# Patient Record
Sex: FEMALE | Race: WHITE | NOT HISPANIC OR LATINO | Employment: STUDENT | URBAN - METROPOLITAN AREA
[De-identification: names, ages, dates, MRNs, and addresses within clinical notes are randomized per-mention and may not be internally consistent; named-entity substitution may affect disease eponyms.]

---
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
## 2024-06-17 ENCOUNTER — OFFICE VISIT (OUTPATIENT)
Dept: URGENT CARE | Facility: CLINIC | Age: 19
End: 2024-06-17
Payer: COMMERCIAL

## 2024-06-17 VITALS
HEART RATE: 94 BPM | SYSTOLIC BLOOD PRESSURE: 166 MMHG | WEIGHT: 145 LBS | TEMPERATURE: 99 F | HEIGHT: 67 IN | BODY MASS INDEX: 22.76 KG/M2 | DIASTOLIC BLOOD PRESSURE: 96 MMHG

## 2024-06-17 DIAGNOSIS — M79.672 LEFT FOOT PAIN: Primary | ICD-10-CM

## 2024-06-17 DIAGNOSIS — S93.602D SPRAIN OF LEFT FOOT, SUBSEQUENT ENCOUNTER: ICD-10-CM

## 2024-06-17 PROCEDURE — 99204 OFFICE O/P NEW MOD 45 MIN: CPT | Mod: S$GLB,,, | Performed by: STUDENT IN AN ORGANIZED HEALTH CARE EDUCATION/TRAINING PROGRAM

## 2024-06-17 RX ORDER — ALBUTEROL SULFATE 90 UG/1
2 AEROSOL, METERED RESPIRATORY (INHALATION) EVERY 4 HOURS PRN
COMMUNITY

## 2024-06-17 RX ORDER — METHOCARBAMOL 500 MG/1
500 TABLET, FILM COATED ORAL 4 TIMES DAILY PRN
Qty: 30 TABLET | Refills: 0 | Status: SHIPPED | OUTPATIENT
Start: 2024-06-17 | End: 2024-06-27

## 2024-06-17 RX ORDER — CETIRIZINE HYDROCHLORIDE 10 MG/1
10 TABLET ORAL
COMMUNITY
Start: 2024-06-12

## 2024-06-17 RX ORDER — ADAPALENE AND BENZOYL PEROXIDE GEL, 0.1%/2.5% 1; 25 MG/G; MG/G
GEL TOPICAL
COMMUNITY
Start: 2024-03-21

## 2024-06-17 RX ORDER — IBUPROFEN 600 MG/1
600 TABLET ORAL EVERY 6 HOURS PRN
Qty: 30 TABLET | Refills: 0 | Status: SHIPPED | OUTPATIENT
Start: 2024-06-17

## 2024-06-17 RX ORDER — BUSPIRONE HYDROCHLORIDE 7.5 MG/1
7.5 TABLET ORAL 2 TIMES DAILY
COMMUNITY
Start: 2024-03-05

## 2024-06-17 NOTE — PROGRESS NOTES
"Dictation #1  MRN:[id number]  CSN:[id number] Subjective:      Patient ID: Christen Maravilla is a 18 y.o. female.    Vitals:  height is 5' 7" (1.702 m) and weight is 65.8 kg (145 lb). Her temperature is 98.5 °F (36.9 °C). Her blood pressure is 166/96 (abnormal) and her pulse is 94.     Chief Complaint: Foot Injury    Ambulatory to room with complaint of left foot pain.  Patient states over week ago she [place] and does not remember injuring it, states "had too much fun".  Pain to dorsal surface of left foot    Foot Injury   The incident occurred more than 1 week ago (x's 19 days ago). The injury mechanism was a fall. Associated symptoms include an inability to bear weight, a loss of motion and tingling. She reports no foreign bodies present. She has tried NSAIDs for the symptoms. The treatment provided no relief.       Constitution: Negative.   HENT: Negative.     Neck: neck negative.   Cardiovascular: Negative.    Eyes: Negative.    Respiratory: Negative.     Gastrointestinal: Negative.    Genitourinary: Negative.    Musculoskeletal: Negative.  Positive for pain.   Skin: Negative.    Allergic/Immunologic: Negative.    Psychiatric/Behavioral: Negative.        Objective:     Physical Exam   Constitutional: She is oriented to person, place, and time. She appears well-developed. She is cooperative.   HENT:   Head: Normocephalic and atraumatic.   Ears:   Right Ear: Hearing and external ear normal.   Left Ear: Hearing and external ear normal.   Nose: Nose normal. No mucosal edema or nasal deformity. No epistaxis. Right sinus exhibits no maxillary sinus tenderness and no frontal sinus tenderness. Left sinus exhibits no maxillary sinus tenderness and no frontal sinus tenderness.   Mouth/Throat: Uvula is midline, oropharynx is clear and moist and mucous membranes are normal. No trismus in the jaw. Normal dentition. No uvula swelling.   Eyes: Conjunctivae and lids are normal.   Neck: Trachea normal and " phonation normal. Neck supple.   Cardiovascular: Normal rate, regular rhythm, normal heart sounds and normal pulses.   Pulmonary/Chest: Effort normal and breath sounds normal.   Abdominal: Normal appearance and bowel sounds are normal. Soft.   Musculoskeletal: Normal range of motion.         General: Normal range of motion.      Comments: Mild tenderness to palpation dorsal surface left foot, normal range of motion.  No difficulty ambulating.   Neurological: She is alert and oriented to person, place, and time. She exhibits normal muscle tone.   Skin: Skin is warm, dry and intact.   Psychiatric: Her speech is normal and behavior is normal. Judgment and thought content normal.   Nursing note and vitals reviewed.      Assessment:     1. Left foot pain    2. Sprain of left foot, subsequent encounter        Plan:       Left foot pain  -     Cancel: XR FOOT COMPLETE 3 VIEW RIGHT; Future; Expected date: 06/17/2024  -     XR FOOT COMPLETE 3 VIEW LEFT; Future; Expected date: 06/17/2024    Sprain of left foot, subsequent encounter  -     Physician communication order    Other orders  -     methocarbamoL (ROBAXIN) 500 MG Tab; Take 1 tablet (500 mg total) by mouth 4 (four) times daily as needed.  Dispense: 30 tablet; Refill: 0  -     ibuprofen (ADVIL,MOTRIN) 600 MG tablet; Take 1 tablet (600 mg total) by mouth every 6 (six) hours as needed for Pain.  Dispense: 30 tablet; Refill: 0           Imaging in clinic reveals no acute abnormality.  R.I.C.E. therapy.  Rest, ice, compress, elevate.  For the next 24-48 hours please continue rest, intermittently use ice packs and continue to wear your Ace wrap, maintain elevation to the limb.

## 2024-11-07 ENCOUNTER — OFFICE VISIT (OUTPATIENT)
Dept: URGENT CARE | Facility: CLINIC | Age: 19
End: 2024-11-07
Payer: COMMERCIAL

## 2024-11-07 VITALS
WEIGHT: 145 LBS | DIASTOLIC BLOOD PRESSURE: 82 MMHG | RESPIRATION RATE: 17 BRPM | TEMPERATURE: 99 F | OXYGEN SATURATION: 98 % | BODY MASS INDEX: 22.76 KG/M2 | HEIGHT: 67 IN | HEART RATE: 74 BPM | SYSTOLIC BLOOD PRESSURE: 127 MMHG

## 2024-11-07 DIAGNOSIS — H65.92 MIDDLE EAR EFFUSION, LEFT: ICD-10-CM

## 2024-11-07 DIAGNOSIS — H66.92 LEFT OTITIS MEDIA, UNSPECIFIED OTITIS MEDIA TYPE: ICD-10-CM

## 2024-11-07 DIAGNOSIS — H61.23 BILATERAL IMPACTED CERUMEN: Primary | ICD-10-CM

## 2024-11-07 RX ORDER — NORETHINDRONE ACETATE AND ETHINYL ESTRADIOL AND FERROUS FUMARATE 1MG-20(21)
1 KIT ORAL
COMMUNITY

## 2024-11-07 RX ORDER — AZITHROMYCIN 250 MG/1
TABLET, FILM COATED ORAL
Qty: 6 TABLET | Refills: 0 | Status: SHIPPED | OUTPATIENT
Start: 2024-11-07

## 2024-11-07 NOTE — PATIENT INSTRUCTIONS
Thankyou for the opportunity to take care of you today.  Please take all medications as directed, continue any previous prescribed medications unless we specifically discussed holding them.  If  your situation becomes emergent please present to the nearest emergency department.  Follow-up with your PCP for continued evaluation and management.

## 2024-11-07 NOTE — PROGRESS NOTES
"Subjective:      Patient ID: Christen Maravilla is a 18 y.o. female.    Vitals:  height is 5' 7" (1.702 m) and weight is 65.8 kg (145 lb). Her oral temperature is 98.6 °F (37 °C). Her blood pressure is 127/82 and her pulse is 74. Her respiration is 17 and oxygen saturation is 98%.     Chief Complaint: Otalgia    Ambulatory to room with complaint of left ear congestion states woke up this morning and noted blood on her pillow.    Otalgia   There is pain in the left ear. This is a new problem. The current episode started in the past 7 days. The problem has been unchanged. There has been no fever. Associated symptoms include ear discharge.       HENT:  Positive for ear pain and ear discharge.       Objective:     Physical Exam   Constitutional: She is oriented to person, place, and time. She appears well-developed. She is cooperative.  Non-toxic appearance. She does not appear ill. No distress.   HENT:   Head: Normocephalic and atraumatic.   Ears:   Right Ear: Hearing, external ear and ear canal normal. impacted cerumen  Left Ear: Hearing, external ear and ear canal normal. impacted cerumen  Nose: Nose normal. No mucosal edema, rhinorrhea, nasal deformity or congestion. No epistaxis. Right sinus exhibits no maxillary sinus tenderness and no frontal sinus tenderness. Left sinus exhibits no maxillary sinus tenderness and no frontal sinus tenderness.   Mouth/Throat: Uvula is midline, oropharynx is clear and moist and mucous membranes are normal. No trismus in the jaw. Normal dentition. No uvula swelling. No oropharyngeal exudate, posterior oropharyngeal edema or posterior oropharyngeal erythema.   Eyes: Conjunctivae and lids are normal. No scleral icterus.   Neck: Trachea normal and phonation normal. Neck supple. No edema present. No erythema present. No neck rigidity present.   Cardiovascular: Normal rate, regular rhythm, normal heart sounds and normal pulses.   Pulmonary/Chest: Effort normal and breath sounds normal. No " respiratory distress. She has no decreased breath sounds. She has no rhonchi.   Abdominal: Normal appearance.   Musculoskeletal: Normal range of motion.         General: No deformity. Normal range of motion.   Neurological: She is alert and oriented to person, place, and time. She exhibits normal muscle tone. Coordination normal.   Skin: Skin is warm, dry, intact, not diaphoretic and not pale.   Psychiatric: Her speech is normal and behavior is normal. Judgment and thought content normal.   Nursing note and vitals reviewed.      Assessment:     1. Bilateral impacted cerumen    2. Middle ear effusion, left    3. Left otitis media, unspecified otitis media type        Plan:       Bilateral impacted cerumen  -     Ambulatory referral/consult to ENT    Middle ear effusion, left    Left otitis media, unspecified otitis media type    Other orders  -     azithromycin (Z-ROSALIE) 250 MG tablet; Take 2 tablets by mouth on day 1; Take 1 tablet by mouth on days 2-5  Dispense: 6 tablet; Refill: 0              Bilateral ear irrigation in clinic reveals a normal right tympanic membrane, the left tympanic membrane has some purulent discharge, dried blood is noted, tympanic effusion present.  Speaking with the patient, patient states this has been an ongoing waxing and waning issue for several months now.  She is tearful stating she is just tired of always hurting in her ear.  Commended ENT referral.  Take antibiotic as directed, may take yogurt daily or probiotic to decrease GI upset.  Patient educated that tympanic effusions are common following otitis media treatment.  Instructions given for when to present to ED.  - To ED for any new or acutely worsening symptoms including but not limited to chest pain, palpitations, shortness of breath, or fever greater than 103° F.  Patient in agreement with plan of care.    - The diagnosis, treatment plan, instructions for follow-up and reevaluation as well as ED precautions were discussed and  understanding was verbalized. All questions or concerns have been addressed.  -Follow up with your primary care provider for continued evaluation and management.

## 2024-11-15 ENCOUNTER — OFFICE VISIT (OUTPATIENT)
Dept: OTOLARYNGOLOGY | Facility: CLINIC | Age: 19
End: 2024-11-15
Payer: COMMERCIAL

## 2024-11-15 VITALS — HEIGHT: 67 IN | BODY MASS INDEX: 24.49 KG/M2 | WEIGHT: 156.06 LBS

## 2024-11-15 DIAGNOSIS — H66.012 ACUTE SUPPURATIVE OTITIS MEDIA OF LEFT EAR WITH SPONTANEOUS RUPTURE OF TYMPANIC MEMBRANE, RECURRENCE NOT SPECIFIED: Primary | ICD-10-CM

## 2024-11-15 PROCEDURE — 99999 PR PBB SHADOW E&M-EST. PATIENT-LVL III: CPT | Mod: PBBFAC,,, | Performed by: OTOLARYNGOLOGY

## 2024-11-15 RX ORDER — LEVOFLOXACIN 500 MG/1
500 TABLET, FILM COATED ORAL DAILY
Qty: 10 TABLET | Refills: 0 | Status: SHIPPED | OUTPATIENT
Start: 2024-11-15 | End: 2024-11-25

## 2024-11-15 RX ORDER — CIPROFLOXACIN HYDROCHLORIDE 3 MG/ML
SOLUTION/ DROPS OPHTHALMIC
Qty: 5 ML | Refills: 1 | Status: SHIPPED | OUTPATIENT
Start: 2024-11-15

## 2024-11-15 NOTE — PROGRESS NOTES
Subjective:       Patient ID: Christen Kochield is a 19 y.o. female.    Chief Complaint: Ear Problem (Jesus Manuel Ear Impaction./L) Ear Pain./Sounds like Underwater/Ear Problems since young age approx 4 y/o/Type 1 DM)      This 19-year-old has been having some stuffy feeling in her left ear she has been away at college she saw some blood come out of her left ear went to urgent care where they tried to clean it out caused her some pain and mentioned some problems with the eardrum as a possible explanation for what is going on          Objective:      ENT Physical Exam    So the right ear and ear canal look fine as does the eardrum and she is not having problems on the right     The left side that is wax deep in the ear canal that is no way to see her eardrum so I do not think that we can rely on their recent primary care walk-in clinic description of what they saw.      Using suctioned I was able to clear out a good bit of the wax so I can see most of the eardrum I do not see a perforation it looks pretty good and that is no residual blood in the ear canal or around the wax but the last bit of wax is right against the eardrum inferiorly and a bit too sensitive so we are going to rinse that out now that I have had a look at most of her eardrum    So we irrigated out the wax that was against her eardrum and it was not uncomfortable for her but she tolerated it once it was washed out it was evident that her eardrum really look terrible.  It was bruised and bright red there was no obvious perforation although in retrospect given the recent blood that she has seen she may well have had a ruptured eardrum but it appears to have healed and that is no pus in the ear canal..        Assessment:       1. Acute suppurative otitis media of left ear with spontaneous rupture of tympanic membrane, recurrence not specified         Plan:          So her ear looks terrible that is so bruised and red I think that is all related to an otitis  media but that is really hard to tell so we definitely to follow up    I am putting her on Levaquin this looks so bad I do not think we can afford to miss the bacteria in question that is causing what appears to be an otitis media.  Additionally the outer layer of the eardrum is so red that it is a little hard to tell exactly the status of the middle ear or the eardrum itself although it does not appear perforated    I have scheduled her and a couple of weeks for repeat examination and I have encouraged her to contact me on the portal if she has any problems between now and then that I can address for her and we have discussed the potential concerns of Levaquin with tendon and joint pain and problems

## 2024-11-27 ENCOUNTER — OFFICE VISIT (OUTPATIENT)
Dept: OTOLARYNGOLOGY | Facility: CLINIC | Age: 19
End: 2024-11-27
Payer: COMMERCIAL

## 2024-11-27 VITALS — HEIGHT: 67 IN | WEIGHT: 154.69 LBS | BODY MASS INDEX: 24.28 KG/M2

## 2024-11-27 DIAGNOSIS — H66.012 ACUTE SUPPURATIVE OTITIS MEDIA OF LEFT EAR WITH SPONTANEOUS RUPTURE OF TYMPANIC MEMBRANE, RECURRENCE NOT SPECIFIED: Primary | ICD-10-CM

## 2024-11-27 PROCEDURE — 1160F RVW MEDS BY RX/DR IN RCRD: CPT | Mod: S$GLB,,, | Performed by: OTOLARYNGOLOGY

## 2024-11-27 PROCEDURE — 3008F BODY MASS INDEX DOCD: CPT | Mod: S$GLB,,, | Performed by: OTOLARYNGOLOGY

## 2024-11-27 PROCEDURE — 99999 PR PBB SHADOW E&M-EST. PATIENT-LVL III: CPT | Mod: PBBFAC,,, | Performed by: OTOLARYNGOLOGY

## 2024-11-27 PROCEDURE — 99213 OFFICE O/P EST LOW 20 MIN: CPT | Mod: S$GLB,,, | Performed by: OTOLARYNGOLOGY

## 2024-11-27 PROCEDURE — 1159F MED LIST DOCD IN RCRD: CPT | Mod: S$GLB,,, | Performed by: OTOLARYNGOLOGY

## 2024-11-27 RX ORDER — PSEUDOEPHEDRINE HCL 120 MG/1
120 TABLET, FILM COATED, EXTENDED RELEASE ORAL EVERY MORNING
Qty: 30 TABLET | Refills: 0 | Status: SHIPPED | OUTPATIENT
Start: 2024-11-27 | End: 2024-12-27

## 2024-11-27 RX ORDER — FLUTICASONE PROPIONATE 50 MCG
2 SPRAY, SUSPENSION (ML) NASAL NIGHTLY
Qty: 15.8 ML | Refills: 11 | Status: SHIPPED | OUTPATIENT
Start: 2024-11-27 | End: 2025-11-27

## 2024-11-27 NOTE — PROGRESS NOTES
Subjective:       Patient ID: Christen Maravilla is a 19 y.o. female.    Chief Complaint: Follow-up (Pt is here to follow up on ear infection )      She returns after an acute otitis media on the left and a really bad looking bruised up eardrum I wanted to see her back she has been on Levaquin she has a cut them in half and divide the dose up throughout the day because it has some GI distress.  She tells me she is familiar with the modified Valsalva maneuver but she just will not do it she says it freaks her out somehow               Objective:      ENT Physical Exam    So the left side looks much better there does appear to be an effusion still has a little moist wax in the ear canal they mentioned some yellow drainage coming out but I do not see a perforation or ear canal infection and they were using some drops.              Assessment:       1. Acute suppurative otitis media of left ear with spontaneous rupture of tympanic membrane, recurrence not specified         Plan:          I am going to send in some Flonase and some pseudoephedrine to help expedite clearing the fluid out she is not willing to do the modified Valsalva maneuver which I have mentioned might help speed this up and she is going to let me know if she does not make slow steady progress with the stuffiness that is still residual in the left ear as expected.

## 2025-05-30 ENCOUNTER — OFFICE VISIT (OUTPATIENT)
Dept: FAMILY MEDICINE | Facility: CLINIC | Age: 20
End: 2025-05-30
Payer: COMMERCIAL

## 2025-05-30 VITALS
BODY MASS INDEX: 23.6 KG/M2 | WEIGHT: 150.38 LBS | HEIGHT: 67 IN | OXYGEN SATURATION: 99 % | SYSTOLIC BLOOD PRESSURE: 110 MMHG | HEART RATE: 97 BPM | RESPIRATION RATE: 16 BRPM | DIASTOLIC BLOOD PRESSURE: 72 MMHG

## 2025-05-30 DIAGNOSIS — F41.1 GENERALIZED ANXIETY DISORDER: ICD-10-CM

## 2025-05-30 DIAGNOSIS — E03.9 ACQUIRED HYPOTHYROIDISM: Chronic | ICD-10-CM

## 2025-05-30 DIAGNOSIS — G40.919 BREAKTHROUGH SEIZURE: ICD-10-CM

## 2025-05-30 DIAGNOSIS — S83.104A DISLOCATION OF RIGHT KNEE, INITIAL ENCOUNTER: ICD-10-CM

## 2025-05-30 DIAGNOSIS — E10.9 TYPE 1 DIABETES MELLITUS WITHOUT COMPLICATION: Primary | ICD-10-CM

## 2025-05-30 DIAGNOSIS — J31.0 CHRONIC RHINITIS: ICD-10-CM

## 2025-05-30 DIAGNOSIS — G43.709 CHRONIC MIGRAINE WITHOUT AURA WITHOUT STATUS MIGRAINOSUS, NOT INTRACTABLE: ICD-10-CM

## 2025-05-30 DIAGNOSIS — J45.20 MILD INTERMITTENT ASTHMA WITHOUT COMPLICATION: ICD-10-CM

## 2025-05-30 PROBLEM — R11.10 VOMITING: Status: RESOLVED | Noted: 2018-03-20 | Resolved: 2025-05-30

## 2025-05-30 PROBLEM — R50.9 FEVER: Status: RESOLVED | Noted: 2018-03-20 | Resolved: 2025-05-30

## 2025-05-30 PROCEDURE — 99999 PR PBB SHADOW E&M-EST. PATIENT-LVL IV: CPT | Mod: PBBFAC,,, | Performed by: STUDENT IN AN ORGANIZED HEALTH CARE EDUCATION/TRAINING PROGRAM

## 2025-05-30 RX ORDER — TOPIRAMATE 25 MG/1
25 TABLET, FILM COATED ORAL DAILY
Qty: 30 TABLET | Refills: 11 | Status: SHIPPED | OUTPATIENT
Start: 2025-05-30 | End: 2026-05-30

## 2025-05-30 NOTE — PROGRESS NOTES
Subjective:       Patient ID: Christen Maravilla is a 19 y.o. female.    Chief Complaint: Migraine (Has been suffering migraines for about 8 years. Went to pediatric neuro. ) and Knee Pain (Right knee dislocates)    History of Present Illness    CHIEF COMPLAINT:  Ms. Maravilla presents today to establish care with complaints of uncontrolled migraines.    MIGRAINES:  She has experienced migraines since age 11. She had a recent hospitalization one month ago for a severe migraine and allergic reaction to Rizatriptan, which caused severe vomiting resulting in facial blood vessel rupture. She discontinued birth control three months ago due to it causing severe migraines. She was previously prescribed Nurtec but was unable to obtain it due to cost. Currently, her migraines remain uncontrolled.    MEDICAL HISTORY:  She was diagnosed with Type 1 Diabetes at age 3, currently managed with an insulin pump and Dexcom continuous glucose monitor. Her most recent A1C was 8.3, measured two weeks after hospital discharge. She has hypothyroidism, managed with Levothyroxine 100 mg with stable dosing for 3-4 years. She has a history of childhood seizures, previously requiring multiple anti-seizure medications, which have since resolved. The medical team suspected the seizures were related to blood sugar. She also has asthma, predominantly triggered by anxiety rather than environmental factors, managed with albuterol inhaler.    MUSCULOSKELETAL:  She initially dislocated her right knee in a horse riding accident 6 months ago with suspected muscle tear. She re-injured the knee 3 months ago after falling down stairs. She currently experiences knee buckling, constant burning pain, and swelling. She uses KT tape for support with some symptom improvement.    PSYCHIATRIC:  She takes Buspar twice daily for anxiety management with overall good response, though notes occasional irritability as a side effect.    ALLERGIES:  She reports allergies to  clindamycin, penicillin, Rizatriptan, latex, and NovoLog. She takes Zyrtec for seasonal allergies, particularly during summer months when she experiences difficulty breathing.    SURGICAL HISTORY:  Her surgical history includes appendectomy, benign left foot tumor removal, colonoscopy, and wisdom teeth extraction.       Review of Systems   Constitutional:  Negative for activity change and appetite change.   Respiratory:  Negative for shortness of breath.    Cardiovascular:  Negative for chest pain.   Gastrointestinal:  Negative for abdominal pain.   Genitourinary:  Negative for dysuria.   Musculoskeletal:  Positive for arthralgias.   Integumentary:  Negative for rash.   Psychiatric/Behavioral:  Negative for sleep disturbance.       Problem List[1]  Christen has a current medication list which includes the following prescription(s): albuterol, buspirone, cetirizine, ibuprofen, insulin lispro protamin-lispro, levothyroxine, and topiramate.        Health Maintenance Due   Topic Date Due    Hepatitis C Screening  Never done    HIV Screening  Never done    Chlamydia Screening  Never done    HPV Vaccines (1 - 3-dose series) Never done    COVID-19 Vaccine (1 - 2024-25 season) Never done      Health Maintenance reviewed and discussed- encourage vaccines.     Objective:      Physical Exam  Constitutional:       General: She is not in acute distress.     Appearance: Normal appearance. She is not ill-appearing.   Eyes:      Conjunctiva/sclera: Conjunctivae normal.   Cardiovascular:      Rate and Rhythm: Normal rate and regular rhythm.      Heart sounds: Normal heart sounds. No murmur heard.  Pulmonary:      Effort: Pulmonary effort is normal. No respiratory distress.      Breath sounds: Normal breath sounds. No wheezing or rales.   Musculoskeletal:      Right lower leg: No edema.      Left lower leg: No edema.   Skin:     General: Skin is warm and dry.   Neurological:      Mental Status: She is alert. Mental status is at baseline.       Gait: Gait normal.   Psychiatric:         Mood and Affect: Mood normal.         Behavior: Behavior normal.         Thought Content: Thought content normal.         Judgment: Judgment normal.             Assessment:       Assessment & Plan    1. Considered migraine management options given history of adverse reactions to rizatriptan and inability to afford Nurtec.           Plan:       1. Type 1 diabetes mellitus without complication  Overview:  Diagnosed at age 3    Assessment & Plan:  Dr. IAN Miller is endocrinology  On insulin pump.  Has a dexcom.  Diabetes Medications              insulin lispro protamin-lispro 100 unit/mL (50-50) InPn Inject into the skin.          Lab Results   Component Value Date    HGBA1C 7.40 (H) 09/05/2021     Last was 8.3.      2. Acquired hypothyroidism  Assessment & Plan:  Stable. Sees endocrinology and on levothyroxine 100mcg    Orders:  -     topiramate (TOPAMAX) 25 MG tablet; Take 1 tablet (25 mg total) by mouth once daily.  Dispense: 30 tablet; Refill: 11  -     Ambulatory referral/consult to Neurology; Future; Expected date: 06/06/2025    3. Breakthrough seizure  Assessment & Plan:  Was on depakote  They believed it to be sugar induced  Seizures have stopped and doing well.  Has not seen neurology in a while      4. Mild intermittent asthma without complication  Assessment & Plan:  Stable with prn albuterol.       5. Generalized anxiety disorder  Assessment & Plan:  Overall doing well on the buspar.  Helps significantly.  Stable. Continue current medications and regular followup.        6. Chronic rhinitis  Assessment & Plan:  Stable on prn zyrtec      7. Chronic migraine without aura without status migrainosus, not intractable  Assessment & Plan:  Was on rizatriptan and had some nausea due to this/allergy.  Could not afford nurtec.  Trial of topamax.  Neurology referral      8. Dislocation of right knee, initial encounter  Assessment & Plan:  S/p accident that caused initial  dislocation and has had multiple episodes, swelling and buckling  Ortho for evaluation and consideration of MRI    Orders:  -     Ambulatory referral/consult to Orthopedics; Future; Expected date: 06/06/2025         This note was generated with the assistance of ambient listening technology. Verbal consent was obtained by the patient and accompanying visitor(s) for the recording of patient appointment to facilitate this note. I attest to having reviewed and edited the generated note for accuracy, though some syntax or spelling errors may persist. Please contact the author of this note for any clarification.                    [1]   Patient Active Problem List  Diagnosis    Type 1 diabetes mellitus    Breakthrough seizure    Heart murmur    Hypothyroidism    Mild intermittent asthma without complication    Generalized anxiety disorder    Chronic rhinitis    Chronic migraine without aura    Dislocation of right knee

## 2025-05-30 NOTE — ASSESSMENT & PLAN NOTE
Was on rizatriptan and had some nausea due to this/allergy.  Could not afford nurtec.  Trial of topamax.  Neurology referral

## 2025-05-30 NOTE — ASSESSMENT & PLAN NOTE
S/p accident that caused initial dislocation and has had multiple episodes, swelling and buckling  Ortho for evaluation and consideration of MRI

## 2025-05-30 NOTE — ASSESSMENT & PLAN NOTE
Overall doing well on the buspar.  Helps significantly.  Stable. Continue current medications and regular followup.

## 2025-05-30 NOTE — ASSESSMENT & PLAN NOTE
Was on depakote  They believed it to be sugar induced  Seizures have stopped and doing well.  Has not seen neurology in a while

## 2025-05-30 NOTE — ASSESSMENT & PLAN NOTE
Dr. IAN Miller is endocrinology  On insulin pump.  Has a dexcom.  Diabetes Medications              insulin lispro protamin-lispro 100 unit/mL (50-50) InPn Inject into the skin.          Lab Results   Component Value Date    HGBA1C 7.40 (H) 09/05/2021     Last was 8.3.

## 2025-06-05 DIAGNOSIS — S89.90XA KNEE INJURY, UNSPECIFIED LATERALITY, INITIAL ENCOUNTER: Primary | ICD-10-CM

## 2025-06-09 ENCOUNTER — HOSPITAL ENCOUNTER (OUTPATIENT)
Dept: RADIOLOGY | Facility: HOSPITAL | Age: 20
Discharge: HOME | End: 2025-06-09
Attending: ORTHOPAEDIC SURGERY
Payer: COMMERCIAL

## 2025-06-09 ENCOUNTER — OFFICE VISIT (OUTPATIENT)
Dept: ORTHOPEDICS | Facility: CLINIC | Age: 20
End: 2025-06-09
Payer: COMMERCIAL

## 2025-06-09 VITALS — HEIGHT: 67 IN | BODY MASS INDEX: 23.62 KG/M2 | WEIGHT: 150.5 LBS

## 2025-06-09 DIAGNOSIS — S89.90XA KNEE INJURY, UNSPECIFIED LATERALITY, INITIAL ENCOUNTER: ICD-10-CM

## 2025-06-09 DIAGNOSIS — S83.104A DISLOCATION OF RIGHT KNEE, INITIAL ENCOUNTER: ICD-10-CM

## 2025-06-09 PROCEDURE — 73562 X-RAY EXAM OF KNEE 3: CPT | Mod: 26,,, | Performed by: RADIOLOGY

## 2025-06-09 PROCEDURE — 1159F MED LIST DOCD IN RCRD: CPT | Mod: S$GLB,,, | Performed by: ORTHOPAEDIC SURGERY

## 2025-06-09 PROCEDURE — 73562 X-RAY EXAM OF KNEE 3: CPT | Mod: TC,50,PN

## 2025-06-09 PROCEDURE — 1160F RVW MEDS BY RX/DR IN RCRD: CPT | Mod: S$GLB,,, | Performed by: ORTHOPAEDIC SURGERY

## 2025-06-09 PROCEDURE — 3008F BODY MASS INDEX DOCD: CPT | Mod: S$GLB,,, | Performed by: ORTHOPAEDIC SURGERY

## 2025-06-09 PROCEDURE — 99999 PR PBB SHADOW E&M-EST. PATIENT-LVL IV: CPT | Mod: PBBFAC,,, | Performed by: ORTHOPAEDIC SURGERY

## 2025-06-09 PROCEDURE — 99204 OFFICE O/P NEW MOD 45 MIN: CPT | Mod: S$GLB,,, | Performed by: ORTHOPAEDIC SURGERY

## 2025-06-09 NOTE — PROGRESS NOTES
Subjective:      Patient ID: Christen Maravilla is a 19 y.o. female.    Chief Complaint: Injury and Pain of the Right Knee (About 3-4 months ago she fell from her horse and then a few weeks after she fell down the stairs)    HPI  19-year-old female long history of intermittent problems with the right knee status post a remote childhood patellar dislocation.  She has a subsequent episode several months ago when she was horseback riding fail gutter foot caught in his stirrup and was drug.  Was told that she likely had a patellar subluxation event or dislocation that has spontaneously reduced she continues to have moderate pain was seen by her PCP and referred for further evaluation.  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  ROS      Objective:    Ortho Exam     Constitutional:   Patient is alert  and oriented in no acute distress  HEENT:  normocephalic atraumatic; PERRL EOMI  Neck:  Supple without adenopathy  Cardiovascular:  Normal rate and rhythm  Pulmonary:  Normal respiratory effort normal chest wall expansion  Abdominal:  Nonprotuberant nondistended  Musculoskeletal:  Patient has a steady minimally antalgic gait  She has a excellent bilateral knee range of motion  Some slight quad atrophy  Minimal apprehension no effusion or gross instability  Neurological:  No focal defect; cranial nerves 2-12 grossly intact  Psychiatric/behavioral:  Mood and behavior normal           My Radiographs Findings:    X-rays of the right knee without acute osseous abnormalities  Assessment:       Encounter Diagnosis   Name Primary?    Dislocation of right knee, initial encounter          Plan:       I have discussed medical condition treatment options with her at length.  We will fit for a patellar stabilizing brace I have discussed some formal therapy use of OTC NSAIDs and generalized activity restrictions follow up in 4-6 weeks if symptoms fail to improve we would suggest an MRI at that time I will see her sooner  "if any questions or problems.        Past Medical History:   Diagnosis Date    Allergy     Depression     Diabetes mellitus     diagnosed in march 2009    DKA (diabetic ketoacidoses) 06/20/2013 2021 [employer] Regulatory Import      Epilepsy     diagnosed in 2009, last seizure february 2012    GERD (gastroesophageal reflux disease)     Hearing loss     Heart murmur 09/28/2015    Hiatal hernia 2010    Hypertension     Hypothyroid 09/2009    Hypothyroid     Migraine headache     Otitis media     Reflux 2010    Seasonal allergies     Seizures     Strep throat     Urinary tract infection     Varicella     after immunization     Past Surgical History:   Procedure Laterality Date    APPENDECTOMY      COLONOSCOPY      TUMOR EXCISION      benign in foot    WISDOM TOOTH EXTRACTION         Current Medications[1]    Review of patient's allergies indicates:   Allergen Reactions    Clindamycin Shortness Of Breath, Itching and Rash    Penicillins Shortness Of Breath, Itching and Rash    Zonisamide Other (See Comments)     Mom states" Pt acted crazy    Latex, natural rubber Blisters    Novolog [insulin aspart] Other (See Comments)     Pain in muscles and joints    Rizatriptan Nausea And Vomiting       Family History   Problem Relation Name Age of Onset    Hypertension Maternal Grandmother      Seizures Maternal Aunt Michelle     Leukemia Maternal Aunt Michelle     Cancer Maternal Aunt Michelle     Hypertension Maternal Grandfather Misha     Cancer Maternal Grandfather [place]     Hypertension Paternal Grandmother [facility]     Cancer Paternal Grandmother [facility]     Cancer Mother [facility]      Social History     Occupational History    Not on file   Tobacco Use    Smoking status: Never     Passive exposure: Yes    Smokeless tobacco: Never    Tobacco comments:     parent smokes outside   Substance and Sexual Activity    Alcohol use: No    Drug use: No    Sexual activity: Not on file            [1]   Current Outpatient " Medications:     albuterol (PROVENTIL/VENTOLIN HFA) 90 mcg/actuation inhaler, Inhale 2 puffs into the lungs every 4 (four) hours as needed., Disp: , Rfl:     busPIRone (BUSPAR) 7.5 MG tablet, Take 7.5 mg by mouth 2 (two) times daily., Disp: , Rfl:     cetirizine (ZYRTEC) 10 MG tablet, Take 10 mg by mouth., Disp: , Rfl:     ibuprofen (ADVIL,MOTRIN) 600 MG tablet, Take 1 tablet (600 mg total) by mouth every 6 (six) hours as needed for Pain., Disp: 30 tablet, Rfl: 0    insulin lispro protamin-lispro 100 unit/mL (50-50) InPn, Inject into the skin., Disp: , Rfl:     levothyroxine (TIROSINT) 100 mcg Cap, Take 100 mcg by mouth before breakfast., Disp: , Rfl:     topiramate (TOPAMAX) 25 MG tablet, Take 1 tablet (25 mg total) by mouth once daily., Disp: 30 tablet, Rfl: 11

## 2025-09-02 ENCOUNTER — LAB VISIT (OUTPATIENT)
Dept: LAB | Facility: HOSPITAL | Age: 20
End: 2025-09-02
Attending: NURSE PRACTITIONER
Payer: COMMERCIAL

## 2025-09-02 ENCOUNTER — OFFICE VISIT (OUTPATIENT)
Dept: FAMILY MEDICINE | Facility: CLINIC | Age: 20
End: 2025-09-02
Payer: COMMERCIAL

## 2025-09-02 VITALS
WEIGHT: 150.63 LBS | OXYGEN SATURATION: 97 % | DIASTOLIC BLOOD PRESSURE: 78 MMHG | SYSTOLIC BLOOD PRESSURE: 122 MMHG | BODY MASS INDEX: 23.64 KG/M2 | RESPIRATION RATE: 12 BRPM | HEIGHT: 67 IN | HEART RATE: 73 BPM

## 2025-09-02 DIAGNOSIS — Z86.2 HISTORY OF ANEMIA: ICD-10-CM

## 2025-09-02 DIAGNOSIS — H61.23 BILATERAL IMPACTED CERUMEN: ICD-10-CM

## 2025-09-02 DIAGNOSIS — E55.9 VITAMIN D DEFICIENCY: ICD-10-CM

## 2025-09-02 DIAGNOSIS — G43.709 CHRONIC MIGRAINE WITHOUT AURA WITHOUT STATUS MIGRAINOSUS, NOT INTRACTABLE: ICD-10-CM

## 2025-09-02 DIAGNOSIS — G43.709 CHRONIC MIGRAINE WITHOUT AURA WITHOUT STATUS MIGRAINOSUS, NOT INTRACTABLE: Primary | ICD-10-CM

## 2025-09-02 LAB
25(OH)D3+25(OH)D2 SERPL-MCNC: 18 NG/ML (ref 30–96)
FERRITIN SERPL-MCNC: 34 NG/ML (ref 20–300)
IRON SATN MFR SERPL: 29 % (ref 20–50)
IRON SERPL-MCNC: 115 UG/DL (ref 30–160)
MAGNESIUM SERPL-MCNC: 1.8 MG/DL (ref 1.6–2.6)
TIBC SERPL-MCNC: 400 UG/DL (ref 250–450)
TRANSFERRIN SERPL-MCNC: 270 MG/DL (ref 200–375)
VIT B12 SERPL-MCNC: 547 PG/ML (ref 210–950)

## 2025-09-02 PROCEDURE — 83735 ASSAY OF MAGNESIUM: CPT

## 2025-09-02 PROCEDURE — 99214 OFFICE O/P EST MOD 30 MIN: CPT | Mod: S$GLB,,, | Performed by: NURSE PRACTITIONER

## 2025-09-02 PROCEDURE — 82306 VITAMIN D 25 HYDROXY: CPT

## 2025-09-02 PROCEDURE — 36415 COLL VENOUS BLD VENIPUNCTURE: CPT

## 2025-09-02 PROCEDURE — 83540 ASSAY OF IRON: CPT

## 2025-09-02 PROCEDURE — 3078F DIAST BP <80 MM HG: CPT | Mod: S$GLB,,, | Performed by: NURSE PRACTITIONER

## 2025-09-02 PROCEDURE — 82607 VITAMIN B-12: CPT

## 2025-09-02 PROCEDURE — 3008F BODY MASS INDEX DOCD: CPT | Mod: S$GLB,,, | Performed by: NURSE PRACTITIONER

## 2025-09-02 PROCEDURE — 3074F SYST BP LT 130 MM HG: CPT | Mod: S$GLB,,, | Performed by: NURSE PRACTITIONER

## 2025-09-02 PROCEDURE — 99999 PR PBB SHADOW E&M-EST. PATIENT-LVL IV: CPT | Mod: PBBFAC,,, | Performed by: NURSE PRACTITIONER

## 2025-09-02 PROCEDURE — 82728 ASSAY OF FERRITIN: CPT

## 2025-09-02 PROCEDURE — 1159F MED LIST DOCD IN RCRD: CPT | Mod: S$GLB,,, | Performed by: NURSE PRACTITIONER

## 2025-09-02 RX ORDER — BUTALBITAL, ACETAMINOPHEN AND CAFFEINE 50; 325; 40 MG/1; MG/1; MG/1
1 TABLET ORAL EVERY 6 HOURS PRN
COMMUNITY
Start: 2025-04-15 | End: 2025-09-02 | Stop reason: SDUPTHER

## 2025-09-02 RX ORDER — INSULIN LISPRO 100 [IU]/ML
INJECTION, SOLUTION INTRAVENOUS; SUBCUTANEOUS
COMMUNITY
Start: 2025-08-16

## 2025-09-02 RX ORDER — RIMEGEPANT SULFATE 75 MG/75MG
75 TABLET, ORALLY DISINTEGRATING ORAL ONCE AS NEEDED
Qty: 10 TABLET | Refills: 2 | Status: SHIPPED | OUTPATIENT
Start: 2025-09-02 | End: 2025-09-02

## 2025-09-02 RX ORDER — INSULIN PMP CART,AUT,G6/7,CNTR
EACH SUBCUTANEOUS
COMMUNITY
Start: 2025-08-16

## 2025-09-02 RX ORDER — BUTALBITAL, ACETAMINOPHEN AND CAFFEINE 50; 325; 40 MG/1; MG/1; MG/1
1 TABLET ORAL EVERY 6 HOURS PRN
Qty: 20 TABLET | Refills: 0 | Status: SHIPPED | OUTPATIENT
Start: 2025-09-02

## 2025-09-02 RX ORDER — INSULIN PMP CART,AUT,G6/7,CNTR
EACH SUBCUTANEOUS
COMMUNITY
Start: 2025-03-28